# Patient Record
Sex: FEMALE
[De-identification: names, ages, dates, MRNs, and addresses within clinical notes are randomized per-mention and may not be internally consistent; named-entity substitution may affect disease eponyms.]

---

## 2024-05-20 PROBLEM — Z00.00 ENCOUNTER FOR PREVENTIVE HEALTH EXAMINATION: Status: ACTIVE | Noted: 2024-05-20

## 2024-05-22 ENCOUNTER — APPOINTMENT (OUTPATIENT)
Dept: BARIATRICS | Facility: CLINIC | Age: 39
End: 2024-05-22
Payer: MEDICAID

## 2024-05-22 VITALS
HEART RATE: 85 BPM | DIASTOLIC BLOOD PRESSURE: 76 MMHG | WEIGHT: 220.25 LBS | HEIGHT: 63 IN | OXYGEN SATURATION: 98 % | BODY MASS INDEX: 39.02 KG/M2 | SYSTOLIC BLOOD PRESSURE: 112 MMHG | TEMPERATURE: 97.9 F

## 2024-05-22 DIAGNOSIS — F32.A DEPRESSION, UNSPECIFIED: ICD-10-CM

## 2024-05-22 DIAGNOSIS — Z80.0 FAMILY HISTORY OF MALIGNANT NEOPLASM OF DIGESTIVE ORGANS: ICD-10-CM

## 2024-05-22 DIAGNOSIS — E07.9 DISORDER OF THYROID, UNSPECIFIED: ICD-10-CM

## 2024-05-22 DIAGNOSIS — Z78.9 OTHER SPECIFIED HEALTH STATUS: ICD-10-CM

## 2024-05-22 DIAGNOSIS — Z83.49 FAMILY HISTORY OF OTHER ENDOCRINE, NUTRITIONAL AND METABOLIC DISEASES: ICD-10-CM

## 2024-05-22 DIAGNOSIS — I83.93 ASYMPTOMATIC VARICOSE VEINS OF BILATERAL LOWER EXTREMITIES: ICD-10-CM

## 2024-05-22 DIAGNOSIS — E66.01 MORBID (SEVERE) OBESITY DUE TO EXCESS CALORIES: ICD-10-CM

## 2024-05-22 DIAGNOSIS — R06.02 SHORTNESS OF BREATH: ICD-10-CM

## 2024-05-22 PROCEDURE — 99215 OFFICE O/P EST HI 40 MIN: CPT

## 2024-05-22 RX ORDER — LEVOTHYROXINE SODIUM 0.17 MG/1
175 TABLET ORAL
Refills: 0 | Status: ACTIVE | COMMUNITY

## 2024-05-22 RX ORDER — LAMOTRIGINE 25 MG/1
TABLET ORAL
Refills: 0 | Status: ACTIVE | COMMUNITY

## 2024-05-22 RX ORDER — LIOTHYRONINE SODIUM 5 UG/1
TABLET ORAL
Refills: 0 | Status: ACTIVE | COMMUNITY

## 2024-05-24 NOTE — HISTORY OF PRESENT ILLNESS
[de-identified] : Winston is a 38-year-old F here today with a PMHx of morbid obesity (BMI 39), ED (bulimia and anorexia over 10+ year span), and behavioral health issues here today for initial consultation of bariatric intervention. She states that she has seen dieticians, taken weight loss medications, and has been followed by therapists her entire adult life. She states also that she experiences body pains, SOB, and irregular periods, fatigue, and weakness. Denies GERD.   During today's visit, I had a length conversation with Mrs. Phan about surgical and non-surgical options for weight loss. She expressed interest in surgical intervention; and I advised her of the risks of surgery considering her history of long-term eating disorder. Recommended that prior to proceeding forward, a psychologist must monitor her with some degree of familiarity and provide consent to operate. Explained to patient that insulin resistance is likely a contributing factor to her weight and mental health and offered recommendations for lifestyle modifications, including proper diet and ongoing physical activity. Bariatric workup will begin once psych evaluation and consent are provided.  SHx: - Has 5 children  -    FHx: - morbid obesity

## 2024-05-24 NOTE — ADDENDUM
[FreeTextEntry1] : Documented by Kavon Fisher acting as a Scribe for MANDY RODRIGUEZ on 05/24/2024.   All medical record entries made by the Scribe were at my direction and personally dictated by me, MANDY RODRIGUEZ, on 05/24/2024. I have reviewed the chart and agree that the record accurately reflects my personal performances of the history, physical exam, assessment and plan. I have also personally directed, reviewed, and agree with the discharge instructions.

## 2024-05-24 NOTE — ASSESSMENT
[FreeTextEntry1] : Winston is a 38-year-old F here today with a PMHx of morbid obesity (BMI 39), ED (bulimia and anorexia over 10+ year span), and behavioral health issues here today for initial consultation of bariatric intervention. She states that she has seen dieticians, taken weight loss medications, and has been followed by therapists her entire adult life. She states also that she experiences body pains, SOB, and irregular periods, fatigue, and weakness. Denies GERD.   During today's visit, I had a length conversation with Mrs. Phan about surgical and non-surgical options for weight loss. She expressed interest in surgical intervention; and I advised her of the risks of surgery considering her history of long-term eating disorder. Recommended that prior to proceeding forward, a psychologist must monitor her with some degree of familiarity and provide consent to operate. Explained to patient that insulin resistance is likely a contributing factor to her weight and mental health and offered recommendations for lifestyle modifications, including proper diet and ongoing physical activity. Bariatric workup will begin once psych evaluation and consent are provided. 
Greater than 95%

## 2024-06-03 ENCOUNTER — APPOINTMENT (OUTPATIENT)
Dept: BARIATRICS | Facility: CLINIC | Age: 39
End: 2024-06-03

## 2024-06-04 ENCOUNTER — NON-APPOINTMENT (OUTPATIENT)
Age: 39
End: 2024-06-04

## 2024-06-04 ENCOUNTER — APPOINTMENT (OUTPATIENT)
Dept: BARIATRICS | Facility: CLINIC | Age: 39
End: 2024-06-04
Payer: MEDICAID

## 2024-06-04 VITALS — WEIGHT: 220 LBS | BODY MASS INDEX: 38.98 KG/M2 | HEIGHT: 63 IN

## 2024-06-04 PROCEDURE — 98967 PH1 ASSMT&MGMT NQHP 11-20: CPT

## 2024-07-02 ENCOUNTER — APPOINTMENT (OUTPATIENT)
Dept: BARIATRICS | Facility: CLINIC | Age: 39
End: 2024-07-02

## 2025-05-28 ENCOUNTER — APPOINTMENT (OUTPATIENT)
Dept: BARIATRICS | Facility: CLINIC | Age: 40
End: 2025-05-28